# Patient Record
(demographics unavailable — no encounter records)

---

## 2019-02-08 NOTE — CT
CT ABDOMEN AND PELVIS WITH IV CONTRAST:

 

02/08/2019

 

PROVIDED CLINICAL HISTORY:

Left flank pain.

 

FINDINGS:

The visualized lung bases are free of significant opacity.

 

The liver, spleen, pancreas, kidneys, and adrenal glands demonstrate an unremarkable CT appearance.

 

There is no bowel dilatation, inflammatory fat stranding, free fluid, or lymph node enlargement appar
ent.

 

Occasional vascular calcifications are seen.

 

The osseous structures demonstrate no concerning osteoblastic or osteolytic lesions.

 

Scattered descending and sigmoid colonic diverticula are seen.

 

The appendix appears normal.

 

IMPRESSION:

1.  No evidence for an acute process.

 

2.  Chronic findings, as above.

 

POS: PRANAYH